# Patient Record
Sex: FEMALE | URBAN - METROPOLITAN AREA
[De-identification: names, ages, dates, MRNs, and addresses within clinical notes are randomized per-mention and may not be internally consistent; named-entity substitution may affect disease eponyms.]

---

## 2018-08-13 ENCOUNTER — OFFICE VISIT (OUTPATIENT)
Dept: URBAN - METROPOLITAN AREA CLINIC 23 | Facility: CLINIC | Age: 78
End: 2018-08-13
Payer: COMMERCIAL

## 2018-08-13 PROCEDURE — 99213 OFFICE O/P EST LOW 20 MIN: CPT | Performed by: OPTOMETRIST

## 2018-08-13 RX ORDER — PREDNISOLONE ACETATE 10 MG/ML
1 % SUSPENSION/ DROPS OPHTHALMIC
Qty: 1 | Refills: 0 | Status: INACTIVE
Start: 2018-08-13 | End: 2018-08-17

## 2018-08-13 ASSESSMENT — INTRAOCULAR PRESSURE
OD: 15
OS: 14

## 2018-08-13 ASSESSMENT — VISUAL ACUITY
OS: 20/20
OD: 20/25

## 2018-08-13 ASSESSMENT — KERATOMETRY: OD: 44.50

## 2018-08-13 NOTE — IMPRESSION/PLAN
Impression: Uveitis NOS: H20.9. OD. Plan: very mild uveitis secondary to trauma - begin pred forte QID x 1 week. RTC 4 days x follow up.

## 2018-08-17 ENCOUNTER — OFFICE VISIT (OUTPATIENT)
Dept: URBAN - METROPOLITAN AREA CLINIC 23 | Facility: CLINIC | Age: 78
End: 2018-08-17
Payer: COMMERCIAL

## 2018-08-17 DIAGNOSIS — H20.9 UVEITIS NOS: Primary | ICD-10-CM

## 2018-08-17 PROCEDURE — 99213 OFFICE O/P EST LOW 20 MIN: CPT | Performed by: OPTOMETRIST

## 2018-08-17 ASSESSMENT — INTRAOCULAR PRESSURE
OD: 14
OS: 12

## 2018-08-17 NOTE — IMPRESSION/PLAN
Impression: Uveitis NOS: H20.9. OD. Plan: Condition improved today. Taper medication BID OS x 1 week, then QD x 1 week, then d/c.  RTC 9 months x complete eye exam, or sooner if vision changes.

## 2018-09-05 ENCOUNTER — OFFICE VISIT (OUTPATIENT)
Dept: URBAN - METROPOLITAN AREA CLINIC 23 | Facility: CLINIC | Age: 78
End: 2018-09-05
Payer: COMMERCIAL

## 2018-09-05 PROCEDURE — 99213 OFFICE O/P EST LOW 20 MIN: CPT | Performed by: OPTOMETRIST

## 2018-09-05 ASSESSMENT — INTRAOCULAR PRESSURE
OD: 15
OS: 13

## 2018-09-05 NOTE — IMPRESSION/PLAN
Impression: Primary open-angle glaucoma, bilateral, moderate stage: H40.1132 OU. Plan: Discussed diagnosis in detail with patient. Discussed treatment options with patient. Reassured patient of current condition and treatment. Will continue to monitor IOP. Continue using current medication(s). Continue latanoprost QHS OU.

## 2018-12-05 ENCOUNTER — OFFICE VISIT (OUTPATIENT)
Dept: URBAN - METROPOLITAN AREA CLINIC 23 | Facility: CLINIC | Age: 78
End: 2018-12-05
Payer: MEDICARE

## 2018-12-05 PROCEDURE — 99213 OFFICE O/P EST LOW 20 MIN: CPT | Performed by: OPTOMETRIST

## 2018-12-05 RX ORDER — LATANOPROST 50 UG/ML
0.005 % SOLUTION OPHTHALMIC
Qty: 3 | Refills: 3 | Status: INACTIVE
Start: 2018-12-05 | End: 2019-04-10

## 2018-12-05 ASSESSMENT — INTRAOCULAR PRESSURE
OS: 11
OD: 14

## 2018-12-05 NOTE — IMPRESSION/PLAN
Impression: Primary open-angle glaucoma, bilateral, moderate stage: H40.1132 OU. Plan: Discussed diagnosis in detail with patient. Discussed treatment options with patient. Reassured patient of current condition and treatment. Will continue to monitor IOP. Continue using current medication(s). Continue latanoprost QHS OU, refills sent to [facility] mail order pharmacy today.

## 2019-04-10 ENCOUNTER — OFFICE VISIT (OUTPATIENT)
Dept: URBAN - METROPOLITAN AREA CLINIC 23 | Facility: CLINIC | Age: 79
End: 2019-04-10
Payer: MEDICARE

## 2019-04-10 PROCEDURE — 92083 EXTENDED VISUAL FIELD XM: CPT | Performed by: OPTOMETRIST

## 2019-04-10 PROCEDURE — 92014 COMPRE OPH EXAM EST PT 1/>: CPT | Performed by: OPTOMETRIST

## 2019-04-10 PROCEDURE — 92133 CPTRZD OPH DX IMG PST SGM ON: CPT | Performed by: OPTOMETRIST

## 2019-04-10 RX ORDER — LATANOPROST 50 UG/ML
0.005 % SOLUTION OPHTHALMIC
Qty: 3 | Refills: 3 | Status: INACTIVE
Start: 2019-04-10 | End: 2020-06-04

## 2019-04-10 ASSESSMENT — INTRAOCULAR PRESSURE
OS: 10
OD: 10

## 2019-04-10 NOTE — IMPRESSION/PLAN
Impression: Primary open-angle glaucoma, bilateral, moderate stage: H40.1132 OU. Plan: Intraocular pressure well controlled, tolerating medications. Will continue with same regimen.

## 2019-08-08 ENCOUNTER — OFFICE VISIT (OUTPATIENT)
Dept: URBAN - METROPOLITAN AREA CLINIC 23 | Facility: CLINIC | Age: 79
End: 2019-08-08
Payer: MEDICARE

## 2019-08-08 PROCEDURE — 99213 OFFICE O/P EST LOW 20 MIN: CPT | Performed by: OPTOMETRIST

## 2019-08-08 ASSESSMENT — INTRAOCULAR PRESSURE
OD: 12
OS: 12

## 2019-08-08 NOTE — IMPRESSION/PLAN
Impression: Primary open-angle glaucoma, bilateral, moderate stage: H40.1132 OU. Plan: Discussed diagnosis in detail with patient. Discussed treatment options with patient. Reassured patient of current condition and treatment. Will continue to monitor IOP. Continue using current medication(s).  Continue

## 2019-12-02 ENCOUNTER — OFFICE VISIT (OUTPATIENT)
Dept: URBAN - METROPOLITAN AREA CLINIC 23 | Facility: CLINIC | Age: 79
End: 2019-12-02
Payer: MEDICARE

## 2019-12-02 PROCEDURE — 99213 OFFICE O/P EST LOW 20 MIN: CPT | Performed by: OPTOMETRIST

## 2019-12-02 ASSESSMENT — INTRAOCULAR PRESSURE
OD: 20
OS: 20
OS: 14
OD: 15

## 2019-12-02 NOTE — IMPRESSION/PLAN
Bedside and Verbal shift change report given to Charity Mosquera RN (oncoming nurse) by Juan Antonio Díaz RN (offgoing nurse). Report included the following information SBAR, Kardex, ED Summary, Procedure Summary, Intake/Output, MAR and Recent Results. Impression: Primary open-angle glaucoma, bilateral, moderate stage: H40.1132 OU. Plan: Discussed diagnosis in detail with patient. Discussed treatment options with patient. Reassured patient of current condition and treatment. Will continue to monitor IOP. Continue using current medication(s).  Continue

## 2020-07-13 ENCOUNTER — OFFICE VISIT (OUTPATIENT)
Dept: URBAN - METROPOLITAN AREA CLINIC 23 | Facility: CLINIC | Age: 80
End: 2020-07-13
Payer: MEDICARE

## 2020-07-13 PROCEDURE — 92014 COMPRE OPH EXAM EST PT 1/>: CPT | Performed by: OPTOMETRIST

## 2020-07-13 PROCEDURE — 92083 EXTENDED VISUAL FIELD XM: CPT | Performed by: OPTOMETRIST

## 2020-07-13 PROCEDURE — 92133 CPTRZD OPH DX IMG PST SGM ON: CPT | Performed by: OPTOMETRIST

## 2020-07-13 RX ORDER — LATANOPROST 50 UG/ML
0.005 % SOLUTION OPHTHALMIC
Qty: 3 | Refills: 3 | Status: INACTIVE
Start: 2020-07-13 | End: 2020-11-09

## 2020-07-13 ASSESSMENT — INTRAOCULAR PRESSURE
OS: 17
OD: 17

## 2020-11-09 ENCOUNTER — OFFICE VISIT (OUTPATIENT)
Dept: URBAN - METROPOLITAN AREA CLINIC 23 | Facility: CLINIC | Age: 80
End: 2020-11-09
Payer: MEDICARE

## 2020-11-09 PROCEDURE — 99213 OFFICE O/P EST LOW 20 MIN: CPT | Performed by: OPTOMETRIST

## 2020-11-09 RX ORDER — LATANOPROST 50 UG/ML
0.005 % SOLUTION OPHTHALMIC
Qty: 3 | Refills: 3 | Status: INACTIVE
Start: 2020-11-09 | End: 2021-03-10

## 2020-11-09 ASSESSMENT — INTRAOCULAR PRESSURE
OS: 12
OD: 13

## 2021-03-10 ENCOUNTER — OFFICE VISIT (OUTPATIENT)
Dept: URBAN - METROPOLITAN AREA CLINIC 23 | Facility: CLINIC | Age: 81
End: 2021-03-10
Payer: MEDICARE

## 2021-03-10 DIAGNOSIS — H40.1132 PRIMARY OPEN-ANGLE GLAUCOMA, BILATERAL, MODERATE STAGE: Primary | ICD-10-CM

## 2021-03-10 PROCEDURE — 92014 COMPRE OPH EXAM EST PT 1/>: CPT | Performed by: OPTOMETRIST

## 2021-03-10 RX ORDER — LATANOPROST 50 UG/ML
0.005 % SOLUTION OPHTHALMIC
Qty: 5 | Refills: 6 | Status: INACTIVE
Start: 2021-03-10 | End: 2021-12-03

## 2021-03-10 RX ORDER — LATANOPROST 50 UG/ML
0.005 % SOLUTION OPHTHALMIC
Qty: 5 | Refills: 6 | Status: INACTIVE
Start: 2021-03-10 | End: 2021-03-10

## 2021-03-10 ASSESSMENT — KERATOMETRY
OD: 43.75
OS: 45.63

## 2021-03-10 ASSESSMENT — INTRAOCULAR PRESSURE
OS: 12
OD: 12

## 2021-03-10 NOTE — IMPRESSION/PLAN
Impression: Primary open-angle glaucoma, bilateral, moderate stage: H40.1132 OU. Plan: Discussed diagnosis in detail with patient. Discussed treatment options with patient. Reassured patient of current condition and treatment. Will continue to monitor IOP. Continue using current medication(s) as directed.

## 2021-07-07 ENCOUNTER — OFFICE VISIT (OUTPATIENT)
Dept: URBAN - METROPOLITAN AREA CLINIC 23 | Facility: CLINIC | Age: 81
End: 2021-07-07
Payer: MEDICARE

## 2021-07-07 DIAGNOSIS — Z96.1 PRESENCE OF INTRAOCULAR LENS: ICD-10-CM

## 2021-07-07 PROCEDURE — 92133 CPTRZD OPH DX IMG PST SGM ON: CPT | Performed by: OPHTHALMOLOGY

## 2021-07-07 PROCEDURE — 99204 OFFICE O/P NEW MOD 45 MIN: CPT | Performed by: OPHTHALMOLOGY

## 2021-07-07 PROCEDURE — 0464T VISUAL EP TEST FOR GLAUCOMA: CPT | Performed by: OPHTHALMOLOGY

## 2021-07-07 PROCEDURE — 92020 GONIOSCOPY: CPT | Performed by: OPHTHALMOLOGY

## 2021-07-07 PROCEDURE — 76514 ECHO EXAM OF EYE THICKNESS: CPT | Performed by: OPHTHALMOLOGY

## 2021-07-07 ASSESSMENT — INTRAOCULAR PRESSURE
OD: 13
OS: 13

## 2021-07-07 NOTE — IMPRESSION/PLAN
Impression: Primary open-angle glaucoma, bilateral, moderate stage: Q66.7354. Plan: Pt has Glaucoma    Gonio :   open bare SS OU with tr pg OU    Pachs:  480's OU     Today's IOP :   13 OU      Tmax  :   20 OU Target IOP low teens OU Pt denies Fhx of Glaucoma Right / Left eye is the better seeing eye HVF Small inferiior scotoma OU - stable 7/7/21 C/D:  OD 0.9 // OS 0.85 OCT:  65/74 Pt denies Sulfa Allergy   // Pt denies Lung /Heart dx Plan :
1. Continue Latanoprost qPM OU 2. STable HVF and OCT today 3. Pt to be medically managed with optometry and to return to glc PRN 4. Discussed details about Glaucoma and that without proper control of pressures irreversible blindness can occur. Patient understands risks. Emphasize compliance with drop and without compliance vision loss progression can occur.

## 2021-11-10 ENCOUNTER — OFFICE VISIT (OUTPATIENT)
Dept: URBAN - METROPOLITAN AREA CLINIC 23 | Facility: CLINIC | Age: 81
End: 2021-11-10
Payer: MEDICARE

## 2021-11-10 PROCEDURE — 99213 OFFICE O/P EST LOW 20 MIN: CPT | Performed by: OPTOMETRIST

## 2021-11-10 ASSESSMENT — INTRAOCULAR PRESSURE
OD: 11
OS: 11

## 2021-11-10 NOTE — IMPRESSION/PLAN
Impression: Primary open-angle glaucoma, bilateral, moderate stage: H40.1132 OU. Condition: established, stable. Plan: Discussed findings. Stable, continue Latanoprost QHS OU. Continue to monitor.

## 2021-12-21 ENCOUNTER — OFFICE VISIT (OUTPATIENT)
Dept: URBAN - METROPOLITAN AREA CLINIC 23 | Facility: CLINIC | Age: 81
End: 2021-12-21
Payer: MEDICARE

## 2021-12-21 DIAGNOSIS — S05.01XA INJURY OF CONJUNCTIVA W/O FB OF RIGHT EYE, INITIAL ENCOUNTER: Primary | ICD-10-CM

## 2021-12-21 PROCEDURE — 99212 OFFICE O/P EST SF 10 MIN: CPT | Performed by: OPTOMETRIST

## 2021-12-21 RX ORDER — NEOMYCIN SULFATE, POLYMYXIN B SULFATE AND DEXAMETHASONE 3.5; 10000; 1 MG/ML; [USP'U]/ML; MG/ML
SUSPENSION OPHTHALMIC
Qty: 5 | Refills: 0 | Status: ACTIVE
Start: 2021-12-21

## 2021-12-21 NOTE — IMPRESSION/PLAN
Impression: Injury of conjunctiva w/o FB of right eye, initial encounter: S05. 01XA Right. Condition: will likely improve. Plan: Patient educated. Prescribed Maxitrol TID OD x 7 days, advised patient to use artificial tears for comfort. Advised patient there appears to be no permanent corneal damage at this point. Call if symptoms worsen.

## 2022-03-02 ENCOUNTER — OFFICE VISIT (OUTPATIENT)
Dept: URBAN - METROPOLITAN AREA CLINIC 23 | Facility: CLINIC | Age: 82
End: 2022-03-02
Payer: MEDICARE

## 2022-03-02 DIAGNOSIS — H10.45 OTHER CHRONIC ALLERGIC CONJUNCTIVITIS: ICD-10-CM

## 2022-03-02 PROCEDURE — 92133 CPTRZD OPH DX IMG PST SGM ON: CPT | Performed by: OPTOMETRIST

## 2022-03-02 PROCEDURE — 99213 OFFICE O/P EST LOW 20 MIN: CPT | Performed by: OPTOMETRIST

## 2022-03-02 RX ORDER — LATANOPROST 50 UG/ML
0.005 % SOLUTION OPHTHALMIC
Qty: 7.5 | Refills: 3 | Status: ACTIVE
Start: 2022-03-02

## 2022-03-02 ASSESSMENT — INTRAOCULAR PRESSURE
OD: 11
OS: 11

## 2022-03-02 ASSESSMENT — KERATOMETRY
OD: 44.13
OS: 44.38

## 2022-03-02 NOTE — IMPRESSION/PLAN
Impression: Primary open-angle glaucoma, bilateral, moderate stage: H40.1132 Bilateral. Condition: established, stable. Plan: Pt has Glaucoma         Pachs:  476/484    Today's IOP :  11/11       Tmax  : 20/20 Target IOP low to mid teens Pt denies Fhx of Glaucoma Left eye is the better seeing eye VF: 07/06/2021 C/D: .8 round OU
OCT: 07/07/2021 DE: 03/10/2021, due at next visit. Pt denies Sulfa Allergy   // Pt denies Lung /Heart dx Plan :
1. Continue Latanoprost QHS OU, refill sent 2. IOP and ONH stable, no change to current treatment necessary. Continue to monitor, call with any vision changes. 3. RNFL OCT ordered and reviewed, stable.

## 2022-03-02 NOTE — IMPRESSION/PLAN
Impression: Other chronic allergic conjunctivitis: H10.45 Bilateral. Condition: will continue to monitor. Plan: Discussed findings. Gave coupon for [employer] to use PRN. Advised patient to use artificial tears if eyes feel scratchy. Call if symptoms worsen.

## 2023-11-15 DIAGNOSIS — H40.1132 PRIMARY OPEN-ANGLE GLAUCOMA, BILATERAL, MODERATE STAGE: Primary | ICD-10-CM

## 2023-11-15 PROCEDURE — 92083 EXTENDED VISUAL FIELD XM: CPT | Performed by: OPTOMETRIST

## 2023-11-15 PROCEDURE — 99213 OFFICE O/P EST LOW 20 MIN: CPT | Performed by: OPTOMETRIST

## 2024-07-15 ENCOUNTER — OFFICE VISIT (OUTPATIENT)
Dept: URBAN - METROPOLITAN AREA CLINIC 23 | Facility: CLINIC | Age: 84
End: 2024-07-15
Payer: MEDICARE

## 2024-07-15 DIAGNOSIS — H04.123 DRY EYE SYNDROME OF BILATERAL LACRIMAL GLANDS: ICD-10-CM

## 2024-07-15 DIAGNOSIS — H40.1132 PRIMARY OPEN-ANGLE GLAUCOMA, BILATERAL, MODERATE STAGE: Primary | ICD-10-CM

## 2024-07-15 PROCEDURE — 99213 OFFICE O/P EST LOW 20 MIN: CPT

## 2024-07-15 PROCEDURE — 92133 CPTRZD OPH DX IMG PST SGM ON: CPT

## 2024-07-15 PROCEDURE — 92083 EXTENDED VISUAL FIELD XM: CPT

## 2024-07-15 ASSESSMENT — INTRAOCULAR PRESSURE
OS: 10
OD: 12

## 2025-01-13 ENCOUNTER — OFFICE VISIT (OUTPATIENT)
Dept: URBAN - METROPOLITAN AREA CLINIC 23 | Facility: CLINIC | Age: 85
End: 2025-01-13
Payer: MEDICARE

## 2025-01-13 DIAGNOSIS — H43.813 VITREOUS DEGENERATION, BILATERAL: ICD-10-CM

## 2025-01-13 DIAGNOSIS — H04.123 DRY EYE SYNDROME OF BILATERAL LACRIMAL GLANDS: ICD-10-CM

## 2025-01-13 DIAGNOSIS — H40.1132 PRIMARY OPEN-ANGLE GLAUCOMA, BILATERAL, MODERATE STAGE: Primary | ICD-10-CM

## 2025-01-13 DIAGNOSIS — Z96.1 PRESENCE OF INTRAOCULAR LENS: ICD-10-CM

## 2025-01-13 PROCEDURE — 99214 OFFICE O/P EST MOD 30 MIN: CPT

## 2025-01-13 ASSESSMENT — INTRAOCULAR PRESSURE
OS: 20
OS: 12
OD: 12
OD: 20

## 2025-01-13 ASSESSMENT — KERATOMETRY
OD: 44.00
OS: 44.25

## 2025-07-21 ENCOUNTER — OFFICE VISIT (OUTPATIENT)
Dept: URBAN - METROPOLITAN AREA CLINIC 23 | Facility: CLINIC | Age: 85
End: 2025-07-21
Payer: MEDICARE

## 2025-07-21 DIAGNOSIS — H40.1132 PRIMARY OPEN-ANGLE GLAUCOMA, BILATERAL, MODERATE STAGE: Primary | ICD-10-CM

## 2025-07-21 DIAGNOSIS — Z96.1 PRESENCE OF INTRAOCULAR LENS: ICD-10-CM

## 2025-07-21 DIAGNOSIS — H11.002 PTERYGIUM OF LEFT EYE: ICD-10-CM

## 2025-07-21 DIAGNOSIS — H43.813 VITREOUS DEGENERATION, BILATERAL: ICD-10-CM

## 2025-07-21 PROCEDURE — 92083 EXTENDED VISUAL FIELD XM: CPT

## 2025-07-21 PROCEDURE — 99213 OFFICE O/P EST LOW 20 MIN: CPT

## 2025-07-21 ASSESSMENT — INTRAOCULAR PRESSURE
OD: 13
OS: 12